# Patient Record
(demographics unavailable — no encounter records)

---

## 2024-10-25 NOTE — DISCUSSION/SUMMARY
[FreeTextEntry1] : OAD  inc NIOX Arnuity 100 mcg 1 puff QD and Rinse remains singular noted does  have vivid  dreams but hesitant to change  chronic mild dyspnea on exertion primarily with inclines and significant stairs interval improvement Stable pulmonary physiology Noted nightmares on Singulair discontinued patient will check with cardiology team due to she is in a drug program trial with consideration for the use of Accolate if needed Favor the primary etiology of her concerns regarding dyspnea is the underlying cause of atrial kick with atrial fibrillation ongoing cardiac management post ablation but has had episodes of recurrent atrial arrhythmias An additional contributing factor anemia Patient will consider and is going to be scheduled for cardiac ablation Also has hematologic follow-up These comorbid etiologies regarding relationship to dyspnea was discussed in detail regarding pathophysiology.   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 She has no evidence of inflammatory airway disease Other history as noted above Tinnuitis Significant reflux hiatal hernia issue tachycardia on BB as per patient hold on ICS with stephen and NIOX CHange  singulair 10 mg am for b/c vivid dreams patient states tolerates 5 mg cardiac per patient up to date noted  w/u with CT ABD/Pelvis Monitor PFT data as noted decline in flow rates but clinically asymptomatic in terms of cough on Normalization of NIOX with significant interval improvement of flow rates will hold Singulair and monitor any recurrence of cough chest congestion wheeze Discussed flu vaccination patient will get later in the fall season Advised benefits of COVID-19 vaccine omicron variant booster scheduled Nov 2 2022  Completed fall flu vaccine 2022 will see if  can tolerate singular 5 mg 1/2 dose of 10 mg dose-patient is back at 10 mg noted BB can contribute to  airway inflammation Discussed and can consider formal sleep study for poor sleep hygiene states pneumonia vaccine up  to  date

## 2024-10-25 NOTE — CONSULT LETTER
[Dear  ___] : Dear  [unfilled], [Courtesy Letter:] : I had the pleasure of seeing your patient, [unfilled], in my office today. [Please see my note below.] : Please see my note below. [Sincerely,] : Sincerely, [FreeTextEntry3] : Delon Mcmahon D.O., SHANE\par   of Medicine\par  [facility] [employer]\par

## 2024-10-25 NOTE — HISTORY OF PRESENT ILLNESS
[None] : ~He/She~ has no significant interval events [Difficulty Breathing During Exertion] : stable dyspnea on exertion [Feelings Of Weakness On Exertion] : stable exercise intolerance [Cough] : denies coughing [Wheezing] : denies wheezing [Regional Soft Tissue Swelling Both Lower Extremities] : denies lower extremity edema [Chest Pain Or Discomfort] : denies chest pain [Fever] : denies fever [Never] : never [Wt Gain ___ Lbs] : no recent weight gain [Wt Loss ___ Lbs] : no recent weight loss [Oxygen] : the patient uses no supplemental oxygen [FreeTextEntry1] : issue SOB IBARRA   cardiac with  AFIB and cardiac  ablation NOTED in Trial Librexia vs Eliquis f/u post 6 weeks pos recurrent A fib flutter episodes vasovagal study  GI issue with motility studies pending barium swallow NO reported sec prior radiation singulair night bennett  Post rential tear with laser tx Noted data review from primary care physician anemia   Treatment for inflammatory airway disease with significant resolution of cough shortness of breath wheeze Nocturnal wheeze almost completely resolved allergy cough above resolved with singulair but now minimal inc SOB tinnuitis ENT with use prn Flonase Noted with use of Singulair having very vivid dreaming Above sxs much improved and as noted does better with 5 mg singulair self inc 10 mg  cardiology lowered  metoprol BB 25 mg Incline exertional IBARRA  s/p dx  A Fib  currently on Plavix and Xarelto with active telem monitor noted BB metoprol lowered 50 mg am and 25 pm recurrence of cough Nov Dec  nocturnal " wheeze" ENT - sinus disease and Reflux CT CHEST 4/30/21 stable paramed rad induced fibrosis stable compared to Sept 2019 no recurrent lymphoma  does feel better with Claritin CXR 4/7/22 clear lung Home O2 stats > 95 % not reporting IBARRA also notes metoprolol 50 mg BID   Status post upper endoscopy November 2020 with findings of gastritis esophagitis on PPI and Pepcid States she developed some chest congestion gurgling sensation almost like she has a wheeze associated better No fevers chills or sweats No purulent sputum  66-year-old female chief complaint chronic x15 years shortness of breath dyspnea on exertion She describes the dyspnea on exertion mostly with inclines and stairclimbing.  Otherwise she is able to exercise on a treadmill 30 minutes on a routine basis. Denies exertional chest pain rest chest pain nausea vomiting diaphoresis, jaw pain arm pain with these stair climbing inclines or when she is on the treadmill She does report her cardiac stress test nuclear up to date She has some prior remote history of pneumonia and occasional bronchitis.  Denies any associated wheeze cough purulent sputum hemoptysis. She states several years ago she had a PPD test that was negative. No recent hospitalizations. 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 September 15, 2016  sinus disease with seasonal allergies  [TextBox_4] : 10/25/24 70 yo female, here for f/u no c/c at this time  still having SOB when climbing stairs  intermittent  Feb A FIB cardiac ablation by  Telem  still 20 % AFIB GI w/u : fundoplication

## 2024-10-25 NOTE — PROCEDURE
[FreeTextEntry1] : NIOX  32  ppb pos  airway inflammation 10/25/24 PFT 10/25/24   Flow rates WNL  Lung Volumes WNL  DLCO 64 % with mild  loss fx alveolar capillary units  NIOX 16 normal range 6/26/2024 Pulmonary 6-minute walk exercise study June 26, 2024 Baseline O2 saturation 98% Room air study is normal No evidence of exercise-induced hypoxemia PFT June 26, 2024 Mild restrictive ventilatory impairment Diffusion 71% predicted Hemoglobin 12.0  Chest x-ray PA lateral June 26, 2024 Cardiac size grossly normal No parenchymal infiltrates pleural effusions or dominant pulmonary nodules Very mild unchanged scar identified at the right upper lung zone Soft tissue bony structures unremarkable Data comparison to 11/2/2023 without interval change   NIOX 21 normal range December 21, 2023 PFT 12/21 2023 Spirometry flow rates normal range Normal diffusion 60% predicted mild borderline moderate gas exchange impairment Hemoglobin 9.5   NBIOX  25  ppb upper limits normal 8/3/23 PFT 8/3/23 flow  rates nl  Lung volumes nl  DLCO 64 mild reduction with gas  exchange impairment stable pulmonary physiology  PFT April 19, 2023 Flow rates spirometry normal range Lung volumes normal TLC 85% predicted No air-trapping Diffusion mild borderline moderate reduction 62% predicted with a loss of functioning alveolar capillary units Hemoglobin 11.8 Stable flow rates Overall stable pulmonary physiology NIOX  19   ppb WNL 4/19/2023  Chest x-ray PA lateral April 19, 2023 Cardiac size borderline Overlying left chest wall haziness due to radiation fibrosis posttreatment breast cancer Lung fields otherwise clear No pleural effusion no pneumothorax Mediastinum unremarkable No interval change dating back to chest x-ray 12/7/2020  Spirometry no bronchodilator January 19, 2023 Flow rates are normal Ratio 82 Interval improvement compared to September 2022  NIOX 16 ppb WNL 10/24/22 [place] No BD 10/24/22 minimal reduction FVC  ratio 83 but pos decline at flow rates  PFT without bronchodilator 7/23/2022 Flow rates normal  normal  TLC 88% predicted Diffusion just below normal range 69% predicted 1 hemoglobin 10.9 Flow rates 20 July 25, 2020 demonstrates significant interval improvement  NIOX 16 PPP normal range as compared to prior September 15, 2022  NIOX  32  ppb mild  bronchial inflammation 7/25/22 Spirometry July 25, 2022 Mild reduction FVC Ratio 81 Data comparison to prior spirometry demonstrates positive decline at flow rates  6-minute walk exercise study July 25, 2022 Indication shortness of breath while walking Baseline room air O2 saturation 99% Room air study Noted room air desaturation   PFT 6/10/22 flow rates nl  Lung Volumes nl  TLC 88 %  DLCO 62 % with mild mod loss fx alveolar capillary units HGB 12.2 NIOX  21 ppb WNL 6/10/22  Anton no bronchodilator May 13, 2022 Flow rates are normal Ratio 80 Data comparison to April 20, 2022 demonstrates a greater than 600 cc interval improvement at both the FEV1 and FVC  Outside chest x-ray April 7, 2022 Clear lungs  See above chest CT report from April 2021  PFT April 20, 2022 Moderate reduction in flow rates Total lung capacity 72% of predicted Diffusion 55% predicted with a moderate loss of functioning alveolar capillary units Impression mild to moderate restrictive ventilatory impairment with moderate gas exchange impairment  Data comparison to December 10, 2020 demonstrates a decline of overall pulmonary physiology  NIOX 37 PPB consistent with bronchial inflammation April 20, 2022  X-ray PA lateral December 7, 2020 Normal cardiac size Hazy overall appearance well defined left mid lung zone No interval change compared to chest x-ray of August 15, 2019 Impression defined radiation fibrosis  Pulmonary 6-minute walk step test On O2 saturations 95% No evidence of desaturation Normal study PFT with body box August 15, 2019 Spirometry normal No response to bronchodilator at the FEV1 Lung biopsy normal with total lung capacity 97% of predicted. Specific conductance and resistance normal Mild reduction in diffusion 66% of predicted with a minimal loss of functioning alveolocapillary units Hemoglobin 11.5  [facility] imaging CT chest History Hodgkin's lymphoma Breast cancer post mastectomy with radiation Comparison to CT chest of February 14, 2017 Biapical symmetrical fibrosis Central airways are patent No reported pleural effusions Severe coronary artery calcifications noted No lymphadenopathy reported Bones negative Status post left mastectomy with breast prosthesis Impression no evidence for metastatic disease to the chest No thoracic adenopathy   coronary artery calcification should have cardiac evaluation consultation   CT chest reviewed February 14, 2017 Biapical radiation changes reported as stable compared to prior studies No reported pleural effusions No chest adenopathy noted Severe coronary artery calcification No evidence for intrathoracic neoplasms reported  Covid vaccine fall 2024

## 2024-10-25 NOTE — PHYSICAL EXAM
[General Appearance - Well Developed] : well developed [Normal Appearance] : normal appearance [Well Groomed] : well groomed [General Appearance - Well Nourished] : well nourished [No Deformities] : no deformities [General Appearance - In No Acute Distress] : no acute distress [Normal Conjunctiva] : the conjunctiva exhibited no abnormalities [Eyelids - No Xanthelasma] : the eyelids demonstrated no xanthelasmas [Normal Oropharynx] : normal oropharynx [I] : I [Neck Appearance] : the appearance of the neck was normal [Neck Cervical Mass (___cm)] : no neck mass was observed [Jugular Venous Distention Increased] : there was no jugular-venous distention [Thyroid Diffuse Enlargement] : the thyroid was not enlarged [Heart Rate And Rhythm] : heart rate and rhythm were normal [Heart Sounds] : normal S1 and S2 [Murmurs] : no murmurs present [Arterial Pulses Normal] : the arterial pulses were normal [Edema] : no peripheral edema present [Veins - Varicosity Changes] : no varicosital changes were noted in the lower extremities [Respiration, Rhythm And Depth] : normal respiratory rhythm and effort [Exaggerated Use Of Accessory Muscles For Inspiration] : no accessory muscle use [Chest Palpation] : palpation of the chest revealed no abnormalities [Lungs Percussion] : the lungs were normal to percussion [Bowel Sounds] : normal bowel sounds [Abdomen Soft] : soft [Abdomen Tenderness] : non-tender [Abdomen Mass (___ Cm)] : no abdominal mass palpated [Abnormal Walk] : normal gait [Gait - Sufficient For Exercise Testing] : the gait was sufficient for exercise testing [Nail Clubbing] : no clubbing of the fingernails [Cyanosis, Localized] : no localized cyanosis [Petechial Hemorrhages (___cm)] : no petechial hemorrhages [Skin Color & Pigmentation] : normal skin color and pigmentation [Skin Turgor] : normal skin turgor [] : no rash [Deep Tendon Reflexes (DTR)] : deep tendon reflexes were 2+ and symmetric [Sensation] : the sensory exam was normal to light touch and pinprick [No Focal Deficits] : no focal deficits [Oriented To Time, Place, And Person] : oriented to person, place, and time [Impaired Insight] : insight and judgment were intact [Affect] : the affect was normal [Mood] : the mood was normal [FreeTextEntry1] : Thoracotomy scar

## 2024-10-25 NOTE — REVIEW OF SYSTEMS
[Sinus Problems] : sinus problems [Palpitations] : palpitations [Heartburn] : heartburn [Reflux] : reflux [Indigestion] : indigestion [Negative] : Sleep Disorder [Cough] : no cough [Sputum] : not coughing up ~M sputum [Hemoptysis] : no hemoptysis [Dyspnea] : no dyspnea [Chest Tightness] : no chest tightness [Pleuritic Pain] : no pleuritic pain [Frequent URIs] : no frequent upper respiratory infections [Wheezing] : no wheezing [FreeTextEntry8] :  remote pneumonia history, occasional bronchitis,rad fibrosis [FreeTextEntry9] : Coronary artery disease with 4 coronary artery stents including left main December 21, 2015, P AFIB\par  Right carotid stent September 15, 2016 [de-identified] : Seasonal allergies primarily spring with desensitization [de-identified] : Right arterial branch occlusion followed by carotid stent September 15, 2016

## 2025-01-29 NOTE — CONSULT LETTER
[Dear  ___] : Dear  [unfilled], [Courtesy Letter:] : I had the pleasure of seeing your patient, [unfilled], in my office today. [Please see my note below.] : Please see my note below. [Sincerely,] : Sincerely, [FreeTextEntry3] : Delon Mcmahon D.O., SHANE\par   of Medicine\par  [facility] [employer]\par

## 2025-01-29 NOTE — HISTORY OF PRESENT ILLNESS
[None] : ~He/She~ has no significant interval events [Difficulty Breathing During Exertion] : stable dyspnea on exertion [Feelings Of Weakness On Exertion] : stable exercise intolerance [Cough] : denies coughing [Wheezing] : denies wheezing [Regional Soft Tissue Swelling Both Lower Extremities] : denies lower extremity edema [Chest Pain Or Discomfort] : denies chest pain [Fever] : denies fever [Never] : never [Wt Gain ___ Lbs] : no recent weight gain [Wt Loss ___ Lbs] : no recent weight loss [Oxygen] : the patient uses no supplemental oxygen [FreeTextEntry1] : issue SOB IBARRA   cardiac with  AFIB and cardiac  ablation NOTED in Trial Librexia vs Eliquis f/u post 6 weeks pos recurrent A fib flutter episodes vasovagal study  GI issue with motility studies pending barium swallow NO reported sec prior radiation singulair night bennett  Post rential tear with laser tx Noted data review from primary care physician anemia   Treatment for inflammatory airway disease with significant resolution of cough shortness of breath wheeze Nocturnal wheeze almost completely resolved allergy cough above resolved with singulair but now minimal inc SOB tinnuitis ENT with use prn Flonase Noted with use of Singulair having very vivid dreaming Above sxs much improved and as noted does better with 5 mg singulair self inc 10 mg  cardiology lowered  metoprol BB 25 mg Incline exertional IBARRA  s/p dx  A Fib  currently on Plavix and Xarelto with active telem monitor noted BB metoprol lowered 50 mg am and 25 pm recurrence of cough Nov Dec  nocturnal " wheeze" ENT - sinus disease and Reflux CT CHEST 4/30/21 stable paramed rad induced fibrosis stable compared to Sept 2019 no recurrent lymphoma  does feel better with Claritin CXR 4/7/22 clear lung Home O2 stats > 95 % not reporting IBARRA also notes metoprolol 50 mg BID   Status post upper endoscopy November 2020 with findings of gastritis esophagitis on PPI and Pepcid States she developed some chest congestion gurgling sensation almost like she has a wheeze associated better No fevers chills or sweats No purulent sputum  66-year-old female chief complaint chronic x15 years shortness of breath dyspnea on exertion She describes the dyspnea on exertion mostly with inclines and stairclimbing.  Otherwise she is able to exercise on a treadmill 30 minutes on a routine basis. Denies exertional chest pain rest chest pain nausea vomiting diaphoresis, jaw pain arm pain with these stair climbing inclines or when she is on the treadmill She does report her cardiac stress test nuclear up to date She has some prior remote history of pneumonia and occasional bronchitis.  Denies any associated wheeze cough purulent sputum hemoptysis. She states several years ago she had a PPD test that was negative. No recent hospitalizations. 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 September 15, 2016  sinus disease with seasonal allergies  [TextBox_4] : 1/29/25  70 yo female, here for f/u no wheeze self stopped  accolate felt making mary ann weak  and  inc A  fib sxs  ICS Arnuity lost voice but stopped and then restarted QOD  no c/c at this time  still having SOB when climbing stairs  intermittent  Feb A FIB cardiac ablation by  Telem  still 20 % AFIB GI w/u : fundoplication  noted  inc slat iunatyke per  cardiology but less issue with orthostatic sxs  and snycope  pending NST  then 7  day [place]  possible  repeat cardiac ablation

## 2025-01-29 NOTE — PROCEDURE
[FreeTextEntry1] : NIOX  20 ppb  normalized 1/29/25  PFT 1/29/25 stephen flow  rtaes WNL  Lung volumes  WNL  DLCO 65 % with mild loss fx  alveolar  capillary units HGB 10.5  NIOX  32  ppb pos  airway inflammation 10/25/24 PFT 10/25/24   Flow rates WNL  Lung Volumes WNL  DLCO 64 % with mild  loss fx alveolar capillary units  NIOX 16 normal range 6/26/2024 Pulmonary 6-minute walk exercise study June 26, 2024 Baseline O2 saturation 98% Room air study is normal No evidence of exercise-induced hypoxemia PFT June 26, 2024 Mild restrictive ventilatory impairment Diffusion 71% predicted Hemoglobin 12.0  Chest x-ray PA lateral June 26, 2024 Cardiac size grossly normal No parenchymal infiltrates pleural effusions or dominant pulmonary nodules Very mild unchanged scar identified at the right upper lung zone Soft tissue bony structures unremarkable Data comparison to 11/2/2023 without interval change   NIOX 21 normal range December 21, 2023 PFT 12/21 2023 Spirometry flow rates normal range Normal diffusion 60% predicted mild borderline moderate gas exchange impairment Hemoglobin 9.5   NBIOX  25  ppb upper limits normal 8/3/23 PFT 8/3/23 flow  rates nl  Lung volumes nl  DLCO 64 mild reduction with gas  exchange impairment stable pulmonary physiology  PFT April 19, 2023 Flow rates spirometry normal range Lung volumes normal TLC 85% predicted No air-trapping Diffusion mild borderline moderate reduction 62% predicted with a loss of functioning alveolar capillary units Hemoglobin 11.8 Stable flow rates Overall stable pulmonary physiology NIOX  19   ppb WNL 4/19/2023  Chest x-ray PA lateral April 19, 2023 Cardiac size borderline Overlying left chest wall haziness due to radiation fibrosis posttreatment breast cancer Lung fields otherwise clear No pleural effusion no pneumothorax Mediastinum unremarkable No interval change dating back to chest x-ray 12/7/2020  Spirometry no bronchodilator January 19, 2023 Flow rates are normal Ratio 82 Interval improvement compared to September 2022  NIOX 16 ppb WNL 10/24/22 [place] No BD 10/24/22 minimal reduction FVC  ratio 83 but pos decline at flow rates  PFT without bronchodilator 7/23/2022 Flow rates normal  normal  TLC 88% predicted Diffusion just below normal range 69% predicted 1 hemoglobin 10.9 Flow rates 20 July 25, 2020 demonstrates significant interval improvement  NIOX 16 PPP normal range as compared to prior September 15, 2022  NIOX  32  ppb mild  bronchial inflammation 7/25/22 Spirometry July 25, 2022 Mild reduction FVC Ratio 81 Data comparison to prior spirometry demonstrates positive decline at flow rates  6-minute walk exercise study July 25, 2022 Indication shortness of breath while walking Baseline room air O2 saturation 99% Room air study Noted room air desaturation   PFT 6/10/22 flow rates nl  Lung Volumes nl  TLC 88 %  DLCO 62 % with mild mod loss fx alveolar capillary units HGB 12.2 NIOX  21 ppb WNL 6/10/22  [place] no bronchodilator May 13, 2022 Flow rates are normal Ratio 80 Data comparison to April 20, 2022 demonstrates a greater than 600 cc interval improvement at both the FEV1 and FVC  Outside chest x-ray April 7, 2022 Clear lungs  See above chest CT report from April 2021  PFT April 20, 2022 Moderate reduction in flow rates Total lung capacity 72% of predicted Diffusion 55% predicted with a moderate loss of functioning alveolar capillary units Impression mild to moderate restrictive ventilatory impairment with moderate gas exchange impairment  Data comparison to December 10, 2020 demonstrates a decline of overall pulmonary physiology  NIOX 37 PPB consistent with bronchial inflammation April 20, 2022  X-ray PA lateral December 7, 2020 Normal cardiac size Hazy overall appearance well defined left mid lung zone No interval change compared to chest x-ray of August 15, 2019 Impression defined radiation fibrosis  Pulmonary 6-minute walk step test On O2 saturations 95% No evidence of desaturation Normal study PFT with body box August 15, 2019 Spirometry normal No response to bronchodilator at the FEV1 Lung biopsy normal with total lung capacity 97% of predicted. Specific conductance and resistance normal Mild reduction in diffusion 66% of predicted with a minimal loss of functioning alveolocapillary units Hemoglobin 11.5  [facility] imaging CT chest History Hodgkin's lymphoma Breast cancer post mastectomy with radiation Comparison to CT chest of February 14, 2017 Biapical symmetrical fibrosis Central airways are patent No reported pleural effusions Severe coronary artery calcifications noted No lymphadenopathy reported Bones negative Status post left mastectomy with breast prosthesis Impression no evidence for metastatic disease to the chest No thoracic adenopathy   coronary artery calcification should have cardiac evaluation consultation   CT chest reviewed February 14, 2017 Biapical radiation changes reported as stable compared to prior studies No reported pleural effusions No chest adenopathy noted Severe coronary artery calcification No evidence for intrathoracic neoplasms reported  Covid vaccine fall 2024 Prevnair 20  Pharm fall 2024

## 2025-01-29 NOTE — REVIEW OF SYSTEMS
[Sinus Problems] : sinus problems [Palpitations] : palpitations [Heartburn] : heartburn [Reflux] : reflux [Indigestion] : indigestion [Negative] : Sleep Disorder [Cough] : no cough [Sputum] : not coughing up ~M sputum [Hemoptysis] : no hemoptysis [Dyspnea] : no dyspnea [Chest Tightness] : no chest tightness [Pleuritic Pain] : no pleuritic pain [Frequent URIs] : no frequent upper respiratory infections [Wheezing] : no wheezing [FreeTextEntry8] :  remote pneumonia history, occasional bronchitis,rad fibrosis [FreeTextEntry9] : Coronary artery disease with 4 coronary artery stents including left main December 21, 2015, P AFIB\par  Right carotid stent September 15, 2016 [de-identified] : Seasonal allergies primarily spring with desensitization [de-identified] : Right arterial branch occlusion followed by carotid stent September 15, 2016

## 2025-01-29 NOTE — DISCUSSION/SUMMARY
[FreeTextEntry1] : OAD  inc NIOX Arnuity 100 mcg 1 puff QD and Rinse but noted with VC dysfx QOD  and  stable pulmonary physiology  remains singular noted does  have vivid  dreams but hesitant to change  chronic mild dyspnea on exertion primarily with inclines and significant stairs interval improvement Stable pulmonary physiology Noted nightmares on Singulair discontinued patient will check with cardiology team due to she is in a drug program trial with consideration for the use of Accolate if needed Favor the primary etiology of her concerns regarding dyspnea is the underlying cause of atrial kick with atrial fibrillation ongoing cardiac management post ablation but has had episodes of recurrent atrial arrhythmias An additional contributing factor anemia Patient will consider and is going to be scheduled for cardiac ablation Also has hematologic follow-up These comorbid etiologies regarding relationship to dyspnea was discussed in detail regarding pathophysiology.   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 She has no evidence of inflammatory airway disease Other history as noted above Tinnuitis Significant reflux hiatal hernia issue tachycardia on BB as per patient hold on ICS with stephen and NIOX CHange  singulair 10 mg am for b/c vivid dreams patient states tolerates 5 mg cardiac per patient up to date noted  w/u with CT ABD/Pelvis Monitor PFT data as noted decline in flow rates but clinically asymptomatic in terms of cough on Normalization of NIOX with significant interval improvement of flow rates will hold Singulair and monitor any recurrence of cough chest congestion wheeze Discussed flu vaccination patient will get later in the fall season Advised benefits of COVID-19 vaccine omicron variant booster scheduled Nov 2 2022  Completed fall flu vaccine 2022 will see if  can tolerate singular 5 mg 1/2 dose of 10 mg dose-patient is back at 10 mg noted BB can contribute to  airway inflammation Discussed and can consider formal sleep study for poor sleep hygiene states pneumonia vaccine up  to  date

## 2025-04-30 NOTE — CONSULT LETTER
[Dear  ___] : Dear  [unfilled], [Courtesy Letter:] : I had the pleasure of seeing your patient, [unfilled], in my office today. [Please see my note below.] : Please see my note below. [Sincerely,] : Sincerely, [FreeTextEntry3] : Delon Mcmahon D.O., SHANE\par   of Medicine\par  [facility] [employer]\par

## 2025-04-30 NOTE — PROCEDURE
[FreeTextEntry1] : NIOX 28 mild airway inflammation prior 20 April 30, 2025 PFT April 30, 2025 Spirometry normal Lung volumes demonstrate a mild restrictive ventilatory impairment Total lung capacity 74% predicted Diffusion 55% of predicted with a moderate loss of functioning alveolar capillary units Overall impression mild restrictive ventilatory impairment with moderate gas exchange impairment. Overall stable pulmonary physiology with a waxing and waning diffusion  Chest x-ray PA lateral Overlying haziness left mid to lower lung zone consistent with prior noted radiation fibrosis Right lung is clear No interval change compared to chest x-ray dating back to June 26, 2024  NIOX  20 ppb  normalized 1/29/25  PFT 1/29/25 stephen flow  rtaes WNL  Lung volumes  WNL  DLCO 65 % with mild loss fx  alveolar  capillary units HGB 10.5  NIOX  32  ppb pos  airway inflammation 10/25/24 PFT 10/25/24   Flow rates WNL  Lung Volumes WNL  DLCO 64 % with mild  loss fx alveolar capillary units  NIOX 16 normal range 6/26/2024 Pulmonary 6-minute walk exercise study June 26, 2024 Baseline O2 saturation 98% Room air study is normal No evidence of exercise-induced hypoxemia PFT June 26, 2024 Mild restrictive ventilatory impairment Diffusion 71% predicted Hemoglobin 12.0  Chest x-ray PA lateral June 26, 2024 Cardiac size grossly normal No parenchymal infiltrates pleural effusions or dominant pulmonary nodules Very mild unchanged scar identified at the right upper lung zone Soft tissue bony structures unremarkable Data comparison to 11/2/2023 without interval change   NIOX 21 normal range December 21, 2023 PFT 12/21 2023 Spirometry flow rates normal range Normal diffusion 60% predicted mild borderline moderate gas exchange impairment Hemoglobin 9.5   NBIOX  25  ppb upper limits normal 8/3/23 PFT 8/3/23 flow  rates nl  Lung volumes nl  DLCO 64 mild reduction with gas  exchange impairment stable pulmonary physiology  PFT April 19, 2023 Flow rates spirometry normal range Lung volumes normal TLC 85% predicted No air-trapping Diffusion mild borderline moderate reduction 62% predicted with a loss of functioning alveolar capillary units Hemoglobin 11.8 Stable flow rates Overall stable pulmonary physiology NIOX  19   ppb WNL 4/19/2023  Chest x-ray PA lateral April 19, 2023 Cardiac size borderline Overlying left chest wall haziness due to radiation fibrosis posttreatment breast cancer Lung fields otherwise clear No pleural effusion no pneumothorax Mediastinum unremarkable No interval change dating back to chest x-ray 12/7/2020  Spirometry no bronchodilator January 19, 2023 Flow rates are normal Ratio 82 Interval improvement compared to September 2022  NIOX 16 ppb WNL 10/24/22 Stephen No BD 10/24/22 minimal reduction FVC  ratio 83 but pos decline at flow rates  PFT without bronchodilator 7/23/2022 Flow rates normal  normal  TLC 88% predicted Diffusion just below normal range 69% predicted 1 hemoglobin 10.9 Flow rates 20 July 25, 2020 demonstrates significant interval improvement  NIOX 16 PPP normal range as compared to prior September 15, 2022  NIOX  32  ppb mild  bronchial inflammation 7/25/22 Spirometry July 25, 2022 Mild reduction FVC Ratio 81 Data comparison to prior spirometry demonstrates positive decline at flow rates  6-minute walk exercise study July 25, 2022 Indication shortness of breath while walking Baseline room air O2 saturation 99% Room air study Noted room air desaturation   PFT 6/10/22 flow rates nl  Lung Volumes nl  TLC 88 %  DLCO 62 % with mild mod loss fx alveolar capillary units HGB 12.2 NIOX  21 ppb WNL 6/10/22  Stephen no bronchodilator May 13, 2022 Flow rates are normal Ratio 80 Data comparison to April 20, 2022 demonstrates a greater than 600 cc interval improvement at both the FEV1 and FVC  Outside chest x-ray April 7, 2022 Clear lungs  See above chest CT report from April 2021  PFT April 20, 2022 Moderate reduction in flow rates Total lung capacity 72% of predicted Diffusion 55% predicted with a moderate loss of functioning alveolar capillary units Impression mild to moderate restrictive ventilatory impairment with moderate gas exchange impairment  Data comparison to December 10, 2020 demonstrates a decline of overall pulmonary physiology  NIOX 37 PPB consistent with bronchial inflammation April 20, 2022  X-ray PA lateral December 7, 2020 Normal cardiac size Hazy overall appearance well defined left mid lung zone No interval change compared to chest x-ray of August 15, 2019 Impression defined radiation fibrosis  Pulmonary 6-minute walk step test On O2 saturations 95% No evidence of desaturation Normal study PFT with body box August 15, 2019 Spirometry normal No response to bronchodilator at the FEV1 Lung biopsy normal with total lung capacity 97% of predicted. Specific conductance and resistance normal Mild reduction in diffusion 66% of predicted with a minimal loss of functioning alveolocapillary units Hemoglobin 11.5  [facility] imaging CT chest History Hodgkin's lymphoma Breast cancer post mastectomy with radiation Comparison to CT chest of February 14, 2017 Biapical symmetrical fibrosis Central airways are patent No reported pleural effusions Severe coronary artery calcifications noted No lymphadenopathy reported Bones negative Status post left mastectomy with breast prosthesis Impression no evidence for metastatic disease to the chest No thoracic adenopathy   coronary artery calcification should have cardiac evaluation consultation   CT chest reviewed February 14, 2017 Biapical radiation changes reported as stable compared to prior studies No reported pleural effusions No chest adenopathy noted Severe coronary artery calcification No evidence for intrathoracic neoplasms reported  Covid vaccine fall 2024 Prevnair 20  Pharm fall 2024

## 2025-04-30 NOTE — DISCUSSION/SUMMARY
[FreeTextEntry1] : OAD  inc NIOX Arnuity 100 mcg 1 puff QD and Rinse but noted with VC dysfx QOD  and  stable pulmonary physiology  remains singular noted does  have vivid  dreams but hesitant to change No contraindications to cardiac ablation treatment protocol  chronic mild dyspnea on exertion primarily with inclines and significant stairs interval improvement Stable pulmonary physiology Noted nightmares on Singulair discontinued patient will check with cardiology team due to she is in a drug program trial with consideration for the use of Accolate if needed Favor the primary etiology of her concerns regarding dyspnea is the underlying cause of atrial kick with atrial fibrillation ongoing cardiac management post ablation but has had episodes of recurrent atrial arrhythmias An additional contributing factor anemia Patient will consider and is going to be scheduled for cardiac ablation Also has hematologic follow-up These comorbid etiologies regarding relationship to dyspnea was discussed in detail regarding pathophysiology.   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 She has no evidence of inflammatory airway disease Other history as noted above Tinnuitis Significant reflux hiatal hernia issue tachycardia on BB as per patient hold on ICS with stephen and NIOX CHange  singulair 10 mg am for b/c vivid dreams patient states tolerates 5 mg cardiac per patient up to date noted  w/u with CT ABD/Pelvis Monitor PFT data as noted decline in flow rates but clinically asymptomatic in terms of cough on Normalization of NIOX with significant interval improvement of flow rates will hold Singulair and monitor any recurrence of cough chest congestion wheeze Discussed flu vaccination patient will get later in the fall season Advised benefits of COVID-19 vaccine omicron variant booster scheduled Nov 2 2022  Completed fall flu vaccine 2022 will see if  can tolerate singular 5 mg 1/2 dose of 10 mg dose-patient is back at 10 mg noted BB can contribute to  airway inflammation Discussed and can consider formal sleep study for poor sleep hygiene states pneumonia vaccine up  to  date

## 2025-04-30 NOTE — HISTORY OF PRESENT ILLNESS
[None] : ~He/She~ has no significant interval events [Difficulty Breathing During Exertion] : stable dyspnea on exertion [Feelings Of Weakness On Exertion] : stable exercise intolerance [Cough] : denies coughing [Wheezing] : denies wheezing [Regional Soft Tissue Swelling Both Lower Extremities] : denies lower extremity edema [Chest Pain Or Discomfort] : denies chest pain [Fever] : denies fever [Never] : never [Wt Gain ___ Lbs] : no recent weight gain [Wt Loss ___ Lbs] : no recent weight loss [Oxygen] : the patient uses no supplemental oxygen [FreeTextEntry1] : issue SOB IBARRA   cardiac with  AFIB and cardiac  ablation NOTED in Trial Librexia vs Eliquis f/u post 6 weeks pos recurrent A fib flutter episodes vasovagal study  GI issue with motility studies pending barium swallow NO reported sec prior radiation singulair night bennett  Post rential tear with laser tx Noted data review from primary care physician anemia   Treatment for inflammatory airway disease with significant resolution of cough shortness of breath wheeze Nocturnal wheeze almost completely resolved allergy cough above resolved with singulair but now minimal inc SOB tinnuitis ENT with use prn Flonase Noted with use of Singulair having very vivid dreaming Above sxs much improved and as noted does better with 5 mg singulair self inc 10 mg  cardiology lowered  metoprol BB 25 mg Incline exertional IBARRA  s/p dx  A Fib  currently on Plavix and Xarelto with active telem monitor noted BB metoprol lowered 50 mg am and 25 pm recurrence of cough Nov Dec  nocturnal " wheeze" ENT - sinus disease and Reflux CT CHEST 4/30/21 stable paramed rad induced fibrosis stable compared to Sept 2019 no recurrent lymphoma  does feel better with Claritin CXR 4/7/22 clear lung Home O2 stats > 95 % not reporting IBARRA also notes metoprolol 50 mg BID   Status post upper endoscopy November 2020 with findings of gastritis esophagitis on PPI and Pepcid States she developed some chest congestion gurgling sensation almost like she has a wheeze associated better No fevers chills or sweats No purulent sputum  66-year-old female chief complaint chronic x15 years shortness of breath dyspnea on exertion She describes the dyspnea on exertion mostly with inclines and stairclimbing.  Otherwise she is able to exercise on a treadmill 30 minutes on a routine basis. Denies exertional chest pain rest chest pain nausea vomiting diaphoresis, jaw pain arm pain with these stair climbing inclines or when she is on the treadmill She does report her cardiac stress test nuclear up to date She has some prior remote history of pneumonia and occasional bronchitis.  Denies any associated wheeze cough purulent sputum hemoptysis. She states several years ago she had a PPD test that was negative. No recent hospitalizations. 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 September 15, 2016  sinus disease with seasonal allergies  [TextBox_4] : pending repeat cardiac ablation [facility] s/p Covid booster  Q 6 months but sick x 5  days   70 yo female, here for f/u no wheeze self stopped  accolate felt making legweak  and  inc A  fib sxs  ICS Arnuity lost voice but stopped and then restarted QOD  no c/c at this time  still having SOB when climbing stairs  intermittent  Feb A FIB cardiac ablation by  Telem  still 20 % AFIB GI w/u : fundoplication  noted  inc luis alfredo naranjo per  cardiology but less issue with orthostatic sxs  and snycope  pending NST  then 7  day [place]  possible  repeat cardiac ablation

## 2025-04-30 NOTE — REVIEW OF SYSTEMS
[Sinus Problems] : sinus problems [Palpitations] : palpitations [Heartburn] : heartburn [Reflux] : reflux [Indigestion] : indigestion [Negative] : Sleep Disorder [Cough] : no cough [Sputum] : not coughing up ~M sputum [Hemoptysis] : no hemoptysis [Dyspnea] : no dyspnea [Chest Tightness] : no chest tightness [Pleuritic Pain] : no pleuritic pain [Frequent URIs] : no frequent upper respiratory infections [Wheezing] : no wheezing [FreeTextEntry8] :  remote pneumonia history, occasional bronchitis,rad fibrosis [FreeTextEntry9] : Coronary artery disease with 4 coronary artery stents including left main December 21, 2015, P AFIB\par  Right carotid stent September 15, 2016 [de-identified] : Seasonal allergies primarily spring with desensitization [de-identified] : Right arterial branch occlusion followed by carotid stent September 15, 2016